# Patient Record
Sex: FEMALE | Race: WHITE
[De-identification: names, ages, dates, MRNs, and addresses within clinical notes are randomized per-mention and may not be internally consistent; named-entity substitution may affect disease eponyms.]

---

## 2018-02-22 ENCOUNTER — HOSPITAL ENCOUNTER (OUTPATIENT)
Dept: HOSPITAL 92 - BICULT | Age: 18
Discharge: HOME | End: 2018-02-22
Attending: INTERNAL MEDICINE
Payer: COMMERCIAL

## 2018-02-22 DIAGNOSIS — R10.11: Primary | ICD-10-CM

## 2018-02-22 PROCEDURE — 76705 ECHO EXAM OF ABDOMEN: CPT

## 2018-03-08 ENCOUNTER — HOSPITAL ENCOUNTER (OUTPATIENT)
Dept: HOSPITAL 92 - NM | Age: 18
Discharge: HOME | End: 2018-03-08
Attending: INTERNAL MEDICINE
Payer: COMMERCIAL

## 2018-03-08 DIAGNOSIS — R10.11: Primary | ICD-10-CM

## 2018-03-08 PROCEDURE — 74018 RADEX ABDOMEN 1 VIEW: CPT

## 2018-03-08 PROCEDURE — A9537 TC99M MEBROFENIN: HCPCS

## 2018-03-08 PROCEDURE — 78227 HEPATOBIL SYST IMAGE W/DRUG: CPT

## 2018-03-08 NOTE — NM
NUCLEAR MEDICINE HIDA SCAN WITH DRUG

3/8/18

 

HISTORY: 

Right upper quadrant pain.  

 

COMPARISON:  

None. 

 

FINDINGS:  

Abdominal imaging was performed after the intravenous administration of 5 millicuries technetium 99m 
Mebrofenin. Subsequently for evaluation of ejection fraction, 8 oz of Ensure was given orally in lieu
 of CCK. 

 

There is adequate hepatic uptake of radiotracer. The gallbladder is seen quickly. 

 

The common bile duct and small bowel is seen quickly. Calculated ejection fraction is 37%. This is af
ter only four minutes. 

 

IMPRESSION:  

1.      Normal hepatic uptake of radiotracer and excretion into the small bowel and gallbladder. No e
vidence of cystic duct obstruction. 

2.      Gallbladder ejection fraction at lower limits of normal at 37%. 

 

POS: SERGE

## 2018-03-08 NOTE — RAD
SUPINE ABDOMEN:

 

HISTORY: 

Right upper quadrant pain.

 

FINDINGS: 

There is a small calcific density which overlies the mid 11th rib which could potentially represent a
 gallstone.  The patient is scheduled for abdominal ultrasound and this could be better evaluated by 
ultrasound.

 

Bowel gas pattern is unremarkable.  There is stool throughout the colon.  No mass effect.  No other a
bnormal calcification.

 

IMPRESSION: 

Faint calcification which overlies the right 11th rib could potentially represent gallstone.  Further
 evaluation with ultrasound is recommended.

 

POS: [facility]